# Patient Record
Sex: MALE | Race: BLACK OR AFRICAN AMERICAN | NOT HISPANIC OR LATINO | URBAN - METROPOLITAN AREA
[De-identification: names, ages, dates, MRNs, and addresses within clinical notes are randomized per-mention and may not be internally consistent; named-entity substitution may affect disease eponyms.]

---

## 2017-02-03 ENCOUNTER — OUTPATIENT (OUTPATIENT)
Dept: OUTPATIENT SERVICES | Facility: HOSPITAL | Age: 37
LOS: 1 days | End: 2017-02-03
Payer: COMMERCIAL

## 2017-02-03 VITALS
SYSTOLIC BLOOD PRESSURE: 136 MMHG | OXYGEN SATURATION: 100 % | HEART RATE: 72 BPM | HEIGHT: 66 IN | RESPIRATION RATE: 16 BRPM | DIASTOLIC BLOOD PRESSURE: 86 MMHG | TEMPERATURE: 99 F | WEIGHT: 160.06 LBS

## 2017-02-03 DIAGNOSIS — M23.619: ICD-10-CM

## 2017-02-03 DIAGNOSIS — M23.261 DERANGEMENT OF OTHER LATERAL MENISCUS DUE TO OLD TEAR OR INJURY, RIGHT KNEE: ICD-10-CM

## 2017-02-03 DIAGNOSIS — M23.221 DERANGEMENT OF POSTERIOR HORN OF MEDIAL MENISCUS DUE TO OLD TEAR OR INJURY, RIGHT KNEE: ICD-10-CM

## 2017-02-03 DIAGNOSIS — M23.611 OTHER SPONTANEOUS DISRUPTION OF ANTERIOR CRUCIATE LIGAMENT OF RIGHT KNEE: ICD-10-CM

## 2017-02-03 DIAGNOSIS — Z01.818 ENCOUNTER FOR OTHER PREPROCEDURAL EXAMINATION: ICD-10-CM

## 2017-02-03 LAB
ANION GAP SERPL CALC-SCNC: 6 MMOL/L — SIGNIFICANT CHANGE UP (ref 5–17)
BUN SERPL-MCNC: 12 MG/DL — SIGNIFICANT CHANGE UP (ref 7–18)
CALCIUM SERPL-MCNC: 8.6 MG/DL — SIGNIFICANT CHANGE UP (ref 8.4–10.5)
CHLORIDE SERPL-SCNC: 105 MMOL/L — SIGNIFICANT CHANGE UP (ref 96–108)
CO2 SERPL-SCNC: 31 MMOL/L — SIGNIFICANT CHANGE UP (ref 22–31)
CREAT SERPL-MCNC: 1.14 MG/DL — SIGNIFICANT CHANGE UP (ref 0.5–1.3)
GLUCOSE SERPL-MCNC: 78 MG/DL — SIGNIFICANT CHANGE UP (ref 70–99)
HCT VFR BLD CALC: 48.7 % — SIGNIFICANT CHANGE UP (ref 39–50)
HGB BLD-MCNC: 15.2 G/DL — SIGNIFICANT CHANGE UP (ref 13–17)
MCHC RBC-ENTMCNC: 27.2 PG — SIGNIFICANT CHANGE UP (ref 27–34)
MCHC RBC-ENTMCNC: 31.1 GM/DL — LOW (ref 32–36)
MCV RBC AUTO: 87.5 FL — SIGNIFICANT CHANGE UP (ref 80–100)
PLATELET # BLD AUTO: 160 K/UL — SIGNIFICANT CHANGE UP (ref 150–400)
POTASSIUM SERPL-MCNC: 3.9 MMOL/L — SIGNIFICANT CHANGE UP (ref 3.5–5.3)
POTASSIUM SERPL-SCNC: 3.9 MMOL/L — SIGNIFICANT CHANGE UP (ref 3.5–5.3)
RBC # BLD: 5.57 M/UL — SIGNIFICANT CHANGE UP (ref 4.2–5.8)
RBC # FLD: 12.6 % — SIGNIFICANT CHANGE UP (ref 10.3–14.5)
SODIUM SERPL-SCNC: 142 MMOL/L — SIGNIFICANT CHANGE UP (ref 135–145)
WBC # BLD: 7.1 K/UL — SIGNIFICANT CHANGE UP (ref 3.8–10.5)
WBC # FLD AUTO: 7.1 K/UL — SIGNIFICANT CHANGE UP (ref 3.8–10.5)

## 2017-02-03 PROCEDURE — 80048 BASIC METABOLIC PNL TOTAL CA: CPT

## 2017-02-03 PROCEDURE — 85027 COMPLETE CBC AUTOMATED: CPT

## 2017-02-03 PROCEDURE — G0463: CPT

## 2017-02-03 NOTE — H&P PST ADULT - NSANTHOSAYNRD_GEN_A_CORE
No. SYLVIA screening performed.  STOP BANG Legend: 0-2 = LOW Risk; 3-4 = INTERMEDIATE Risk; 5-8 = HIGH Risk

## 2017-02-03 NOTE — H&P PST ADULT - ACTIVITY
ADLs, Walks >4 flights of stairs, climbs >2 flights of stairs w/o symptoms except for right knee pain

## 2017-02-03 NOTE — H&P PST ADULT - HISTORY OF PRESENT ILLNESS
35 y/o male with no significant past medical history is here today for presurgical evaluation. States he injured his right knee post fall downstairs at his house.  He is scheduled for Right Anterior Cruciate Ligament Reconstruction on 2/9/2017

## 2017-02-03 NOTE — H&P PST ADULT - NEGATIVE CARDIOVASCULAR SYMPTOMS
no peripheral edema/no paroxysmal nocturnal dyspnea/no palpitations/no chest pain/no orthopnea/no dyspnea on exertion/no claudication

## 2017-02-03 NOTE — H&P PST ADULT - NEGATIVE GENERAL SYMPTOMS
no fever/no chills/no weight loss/no polyuria/no polyphagia/no weight gain/no malaise/no fatigue/no sweating/no anorexia/no polydipsia

## 2017-02-03 NOTE — H&P PST ADULT - RS GEN PE MLT RESP DETAILS PC
normal/no chest wall tenderness/no wheezes/breath sounds equal/respirations non-labored/airway patent/good air movement/no subcutaneous emphysema/no rhonchi/clear to auscultation bilaterally/no rales/no intercostal retractions

## 2017-02-03 NOTE — H&P PST ADULT - PROBLEM SELECTOR PLAN 1
Scheduled for Right Anterior Cruciate Ligament Reconstruction on 2/9/2017. Preoperative instructions discussed and given to patient. Verbalized understanding

## 2017-02-03 NOTE — H&P PST ADULT - ASSESSMENT
37 y/o male with derangement of posterior horn of medical meniscus due to old injury, right knee, disruption of anterior cruciate ligament of right knee scheduled for Right Anterior Cruciate Ligament Reconstruction on 2/9/2017. Patient is at low risk for planned procedure 37 y/o male with low SYLVIA risk on STOP BANG screen, derangement of posterior horn of medical meniscus due to old injury, right knee, and disruption of anterior cruciate ligament of right knee scheduled for Right Anterior Cruciate Ligament Reconstruction on 2/9/2017. Patient is at low risk for planned procedure

## 2017-02-09 ENCOUNTER — RESULT REVIEW (OUTPATIENT)
Age: 37
End: 2017-02-09

## 2017-02-09 RX ORDER — SODIUM CHLORIDE 9 MG/ML
3 INJECTION INTRAMUSCULAR; INTRAVENOUS; SUBCUTANEOUS EVERY 8 HOURS
Qty: 0 | Refills: 0 | Status: DISCONTINUED | OUTPATIENT
Start: 2017-02-10 | End: 2017-02-18

## 2017-02-09 RX ORDER — ACETAMINOPHEN 500 MG
975 TABLET ORAL ONCE
Qty: 0 | Refills: 0 | Status: COMPLETED | OUTPATIENT
Start: 2017-02-10 | End: 2017-02-10

## 2017-02-09 RX ORDER — CELECOXIB 200 MG/1
200 CAPSULE ORAL ONCE
Qty: 0 | Refills: 0 | Status: COMPLETED | OUTPATIENT
Start: 2017-02-10 | End: 2017-02-10

## 2017-02-10 ENCOUNTER — OUTPATIENT (OUTPATIENT)
Dept: OUTPATIENT SERVICES | Facility: HOSPITAL | Age: 37
LOS: 1 days | Discharge: HOME | End: 2017-02-10
Payer: COMMERCIAL

## 2017-02-10 VITALS
SYSTOLIC BLOOD PRESSURE: 129 MMHG | OXYGEN SATURATION: 100 % | DIASTOLIC BLOOD PRESSURE: 78 MMHG | TEMPERATURE: 97 F | HEART RATE: 82 BPM | RESPIRATION RATE: 14 BRPM

## 2017-02-10 VITALS
OXYGEN SATURATION: 100 % | TEMPERATURE: 98 F | DIASTOLIC BLOOD PRESSURE: 69 MMHG | RESPIRATION RATE: 17 BRPM | HEIGHT: 66 IN | SYSTOLIC BLOOD PRESSURE: 102 MMHG | WEIGHT: 160.06 LBS | HEART RATE: 83 BPM

## 2017-02-10 DIAGNOSIS — M23.221 DERANGEMENT OF POSTERIOR HORN OF MEDIAL MENISCUS DUE TO OLD TEAR OR INJURY, RIGHT KNEE: ICD-10-CM

## 2017-02-10 DIAGNOSIS — M23.611 OTHER SPONTANEOUS DISRUPTION OF ANTERIOR CRUCIATE LIGAMENT OF RIGHT KNEE: ICD-10-CM

## 2017-02-10 PROCEDURE — 88304 TISSUE EXAM BY PATHOLOGIST: CPT

## 2017-02-10 PROCEDURE — 29888 ARTHRS AID ACL RPR/AGMNTJ: CPT | Mod: RT

## 2017-02-10 PROCEDURE — C1776: CPT

## 2017-02-10 PROCEDURE — 88304 TISSUE EXAM BY PATHOLOGIST: CPT | Mod: 26

## 2017-02-10 PROCEDURE — C1713: CPT

## 2017-02-10 PROCEDURE — 29876 ARTHRS KNEE SURG SYNVCT MAJ: CPT | Mod: RT,XS

## 2017-02-10 PROCEDURE — C1889: CPT

## 2017-02-10 PROCEDURE — 29881 ARTHRS KNE SRG MNISECTMY M/L: CPT | Mod: RT

## 2017-02-10 RX ORDER — ONDANSETRON 8 MG/1
4 TABLET, FILM COATED ORAL EVERY 6 HOURS
Qty: 0 | Refills: 0 | Status: DISCONTINUED | OUTPATIENT
Start: 2017-02-10 | End: 2017-02-18

## 2017-02-10 RX ORDER — CEPHALEXIN 500 MG
1 CAPSULE ORAL
Qty: 20 | Refills: 0 | OUTPATIENT
Start: 2017-02-10 | End: 2017-02-15

## 2017-02-10 RX ORDER — FENTANYL CITRATE 50 UG/ML
25 INJECTION INTRAVENOUS
Qty: 0 | Refills: 0 | Status: DISCONTINUED | OUTPATIENT
Start: 2017-02-10 | End: 2017-02-10

## 2017-02-10 RX ORDER — SODIUM CHLORIDE 9 MG/ML
1000 INJECTION, SOLUTION INTRAVENOUS
Qty: 0 | Refills: 0 | Status: DISCONTINUED | OUTPATIENT
Start: 2017-02-10 | End: 2017-02-18

## 2017-02-10 RX ADMIN — Medication 975 MILLIGRAM(S): at 07:28

## 2017-02-10 RX ADMIN — CELECOXIB 200 MILLIGRAM(S): 200 CAPSULE ORAL at 07:28

## 2017-02-10 RX ADMIN — SODIUM CHLORIDE 3 MILLILITER(S): 9 INJECTION INTRAMUSCULAR; INTRAVENOUS; SUBCUTANEOUS at 07:31

## 2017-02-10 NOTE — PHYSICAL THERAPY INITIAL EVALUATION ADULT - ACTIVE RANGE OF MOTION EXAMINATION, REHAB EVAL
AROM R hip to 50 degrees flex, R knee 0 degrees, R ankle WNL AROM R hip to 50 degrees flex, R knee 0 degrees as in brace, R ankle WNL

## 2017-02-10 NOTE — ASU DISCHARGE PLAN (ADULT/PEDIATRIC). - SPECIAL INSTRUCTIONS
keep right leg in brace in extension at all times. do not bend right knee until instructed by Dr. Murguia. Follow up with Dr. Murguia within 7-10 days for wound check up and further instructions.

## 2017-02-10 NOTE — PHYSICAL THERAPY INITIAL EVALUATION ADULT - GENERAL OBSERVATIONS, REHAB EVAL
Consult received, chart reviewed. Patient received supine in bed, NAD, +knee ext brace, ace wrap at R knee c/d/i, +hemovac, girlfriend and girlfriend's mother present. Patient agreed to EVALUATION from Physical Therapist.

## 2017-02-10 NOTE — ASU DISCHARGE PLAN (ADULT/PEDIATRIC). - NOTIFY
Numbness, tingling/Numbness, color, or temperature change to extremity/Pain not relieved by Medications/Fever greater than 101/Persistent Nausea and Vomiting/Bleeding that does not stop/Swelling that continues

## 2017-02-10 NOTE — ASU DISCHARGE PLAN (ADULT/PEDIATRIC). - ACTIVITY LEVEL
weight bearing as tolerated no weight bearing/weight bearing as tolerated/no sports/gym/no heavy lifting

## 2017-02-10 NOTE — PHYSICAL THERAPY INITIAL EVALUATION ADULT - CRITERIA FOR SKILLED THERAPEUTIC INTERVENTIONS
functional limitations in following categories/impairments found/rehab potential/anticipated discharge recommendation/predicted duration of therapy intervention/therapy frequency

## 2017-02-10 NOTE — PHYSICAL THERAPY INITIAL EVALUATION ADULT - DISCHARGE DISPOSITION, PT EVAL
Outpatient Physical Therapy. Pt. supplied with axillary crutches and demonstrated safe and effective use.

## 2017-02-10 NOTE — ASU DISCHARGE PLAN (ADULT/PEDIATRIC). - MEDICATION SUMMARY - MEDICATIONS TO TAKE
I will START or STAY ON the medications listed below when I get home from the hospital:    Percocet 5/325 oral tablet  -- 1 tab(s) by mouth every 4 hours, As Needed MDD:6 tabs  -- Indication: For pain    Keflex 500 mg oral capsule  -- 1 cap(s) by mouth 4 times a day for 5 days  -- Indication: For prophylactic antibiotic

## 2017-02-10 NOTE — PHYSICAL THERAPY INITIAL EVALUATION ADULT - PASSIVE RANGE OF MOTION EXAMINATION, REHAB EVAL
PROM R hip WFL, R knee 0 degrees ext, R ankle WNL PROM R hip WFL, R knee 0 degrees ext as in brace, R ankle WNL

## 2017-02-10 NOTE — PHYSICAL THERAPY INITIAL EVALUATION ADULT - RANGE OF MOTION EXAMINATION, REHAB EVAL
Left LE ROM was WNL (within normal limits)/Left UE ROM was WNL (within normal limits)/Right UE ROM was WNL (within normal limits)

## 2017-02-12 ENCOUNTER — TRANSCRIPTION ENCOUNTER (OUTPATIENT)
Age: 37
End: 2017-02-12

## 2017-02-14 DIAGNOSIS — M65.861 OTHER SYNOVITIS AND TENOSYNOVITIS, RIGHT LOWER LEG: ICD-10-CM

## 2017-02-14 DIAGNOSIS — M22.41 CHONDROMALACIA PATELLAE, RIGHT KNEE: ICD-10-CM

## 2017-02-14 DIAGNOSIS — F12.10 CANNABIS ABUSE, UNCOMPLICATED: ICD-10-CM

## 2017-02-14 DIAGNOSIS — S83.511A SPRAIN OF ANTERIOR CRUCIATE LIGAMENT OF RIGHT KNEE, INITIAL ENCOUNTER: ICD-10-CM
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
## 2017-02-14 DIAGNOSIS — S83.281A OTHER TEAR OF LATERAL MENISCUS, CURRENT INJURY, RIGHT KNEE, INITIAL ENCOUNTER: ICD-10-CM
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
## 2017-08-12 ENCOUNTER — EMERGENCY (EMERGENCY)
Facility: HOSPITAL | Age: 37
LOS: 1 days | Discharge: HOME | End: 2017-08-12
Attending: EMERGENCY MEDICINE
Payer: MEDICAID

## 2017-08-12 VITALS
TEMPERATURE: 97 F | HEIGHT: 66 IN | HEART RATE: 79 BPM | OXYGEN SATURATION: 100 % | DIASTOLIC BLOOD PRESSURE: 89 MMHG | SYSTOLIC BLOOD PRESSURE: 143 MMHG | WEIGHT: 154.98 LBS | RESPIRATION RATE: 20 BRPM

## 2017-08-12 DIAGNOSIS — N34.2 OTHER URETHRITIS: ICD-10-CM

## 2017-08-12 LAB
APPEARANCE UR: CLEAR — SIGNIFICANT CHANGE UP
BILIRUB UR-MCNC: NEGATIVE — SIGNIFICANT CHANGE UP
COLOR SPEC: YELLOW — SIGNIFICANT CHANGE UP
DIFF PNL FLD: NEGATIVE — SIGNIFICANT CHANGE UP
GLUCOSE UR QL: NEGATIVE — SIGNIFICANT CHANGE UP
KETONES UR-MCNC: ABNORMAL
LEUKOCYTE ESTERASE UR-ACNC: ABNORMAL
NITRITE UR-MCNC: NEGATIVE — SIGNIFICANT CHANGE UP
PH UR: 5 — SIGNIFICANT CHANGE UP (ref 5–8)
PROT UR-MCNC: 15
SP GR SPEC: 1.02 — SIGNIFICANT CHANGE UP (ref 1.01–1.02)
UROBILINOGEN FLD QL: 1

## 2017-08-12 PROCEDURE — 81001 URINALYSIS AUTO W/SCOPE: CPT

## 2017-08-12 PROCEDURE — 99283 EMERGENCY DEPT VISIT LOW MDM: CPT

## 2017-08-12 PROCEDURE — 99283 EMERGENCY DEPT VISIT LOW MDM: CPT | Mod: 25

## 2017-08-12 PROCEDURE — 96372 THER/PROPH/DIAG INJ SC/IM: CPT

## 2017-08-12 RX ORDER — AZITHROMYCIN 500 MG/1
1000 TABLET, FILM COATED ORAL ONCE
Qty: 0 | Refills: 0 | Status: COMPLETED | OUTPATIENT
Start: 2017-08-12 | End: 2017-08-12

## 2017-08-12 RX ORDER — CEFTRIAXONE 500 MG/1
250 INJECTION, POWDER, FOR SOLUTION INTRAMUSCULAR; INTRAVENOUS ONCE
Qty: 0 | Refills: 0 | Status: COMPLETED | OUTPATIENT
Start: 2017-08-12 | End: 2017-08-12

## 2017-08-12 RX ORDER — ONDANSETRON 8 MG/1
4 TABLET, FILM COATED ORAL ONCE
Qty: 0 | Refills: 0 | Status: COMPLETED | OUTPATIENT
Start: 2017-08-12 | End: 2017-08-12

## 2017-08-12 RX ADMIN — AZITHROMYCIN 1000 MILLIGRAM(S): 500 TABLET, FILM COATED ORAL at 11:18

## 2017-08-12 RX ADMIN — CEFTRIAXONE 250 MILLIGRAM(S): 500 INJECTION, POWDER, FOR SOLUTION INTRAMUSCULAR; INTRAVENOUS at 11:18

## 2017-08-12 RX ADMIN — ONDANSETRON 4 MILLIGRAM(S): 8 TABLET, FILM COATED ORAL at 11:18

## 2017-08-12 NOTE — ED ADULT NURSE NOTE - OBJECTIVE STATEMENT
Patient came to the ED a/o x 3 ambulates c/o penile discharges. Patient states burning in urination as well.

## 2017-08-12 NOTE — ED PROVIDER NOTE - OBJECTIVE STATEMENT
35 y/o male with no significant PMHx presents to ED c/o penile discharge x 1 week. Pt notes he is sexually active with his girlfriend whom he has been monogamous with for about one year. He denies any fever, chills, dysuria or any other symptoms at the moment.

## 2017-08-12 NOTE — ED PROVIDER NOTE - MEDICAL DECISION MAKING DETAILS
11:41am- Pt in no distress, will f/u with PMD, refused further STD testing, states has recent testing with negative results. Pt is well appearing walking with normal gait, stable for discharge and follow up with medical doctor. Pt educated on care and need for follow up. Discussed anticipatory guidance and return precautions. Questions answered. I had a detailed discussion with the patient and/or guardian regarding the historical points, exam findings, and any diagnostic results supporting the discharge diagnosis.

## 2017-08-15 LAB
C TRACH RRNA SPEC QL NAA+PROBE: SIGNIFICANT CHANGE UP
N GONORRHOEA RRNA SPEC QL NAA+PROBE: SIGNIFICANT CHANGE UP

## 2017-08-16 LAB — SPECIMEN SOURCE: SIGNIFICANT CHANGE UP

## 2018-04-29 ENCOUNTER — TRANSCRIPTION ENCOUNTER (OUTPATIENT)
Age: 38
End: 2018-04-29

## 2020-03-11 ENCOUNTER — EMERGENCY (EMERGENCY)
Facility: HOSPITAL | Age: 40
LOS: 1 days | Discharge: HOME | End: 2020-03-11
Attending: EMERGENCY MEDICINE
Payer: MEDICAID

## 2020-03-11 VITALS
RESPIRATION RATE: 16 BRPM | OXYGEN SATURATION: 97 % | TEMPERATURE: 100 F | WEIGHT: 154.98 LBS | DIASTOLIC BLOOD PRESSURE: 81 MMHG | SYSTOLIC BLOOD PRESSURE: 144 MMHG | HEIGHT: 65 IN | HEART RATE: 93 BPM

## 2020-03-11 VITALS
HEART RATE: 81 BPM | DIASTOLIC BLOOD PRESSURE: 90 MMHG | SYSTOLIC BLOOD PRESSURE: 139 MMHG | RESPIRATION RATE: 18 BRPM | OXYGEN SATURATION: 99 % | TEMPERATURE: 99 F

## 2020-03-11 LAB
ALBUMIN SERPL ELPH-MCNC: 3.6 G/DL — SIGNIFICANT CHANGE UP (ref 3.5–5)
ALP SERPL-CCNC: 69 U/L — SIGNIFICANT CHANGE UP (ref 40–120)
ALT FLD-CCNC: 21 U/L DA — SIGNIFICANT CHANGE UP (ref 10–60)
ANION GAP SERPL CALC-SCNC: 5 MMOL/L — SIGNIFICANT CHANGE UP (ref 5–17)
AST SERPL-CCNC: 24 U/L — SIGNIFICANT CHANGE UP (ref 10–40)
BASOPHILS # BLD AUTO: 0.03 K/UL — SIGNIFICANT CHANGE UP (ref 0–0.2)
BASOPHILS NFR BLD AUTO: 0.4 % — SIGNIFICANT CHANGE UP (ref 0–2)
BILIRUB SERPL-MCNC: 1.3 MG/DL — HIGH (ref 0.2–1.2)
BUN SERPL-MCNC: 9 MG/DL — SIGNIFICANT CHANGE UP (ref 7–18)
CALCIUM SERPL-MCNC: 8.6 MG/DL — SIGNIFICANT CHANGE UP (ref 8.4–10.5)
CHLORIDE SERPL-SCNC: 108 MMOL/L — SIGNIFICANT CHANGE UP (ref 96–108)
CO2 SERPL-SCNC: 26 MMOL/L — SIGNIFICANT CHANGE UP (ref 22–31)
CREAT SERPL-MCNC: 0.97 MG/DL — SIGNIFICANT CHANGE UP (ref 0.5–1.3)
EOSINOPHIL # BLD AUTO: 0 K/UL — SIGNIFICANT CHANGE UP (ref 0–0.5)
EOSINOPHIL NFR BLD AUTO: 0 % — SIGNIFICANT CHANGE UP (ref 0–6)
GLUCOSE SERPL-MCNC: 92 MG/DL — SIGNIFICANT CHANGE UP (ref 70–99)
HCT VFR BLD CALC: 42.9 % — SIGNIFICANT CHANGE UP (ref 39–50)
HGB BLD-MCNC: 13.7 G/DL — SIGNIFICANT CHANGE UP (ref 13–17)
IMM GRANULOCYTES NFR BLD AUTO: 0.2 % — SIGNIFICANT CHANGE UP (ref 0–1.5)
LIDOCAIN IGE QN: 249 U/L — SIGNIFICANT CHANGE UP (ref 73–393)
LYMPHOCYTES # BLD AUTO: 0.69 K/UL — LOW (ref 1–3.3)
LYMPHOCYTES # BLD AUTO: 8.1 % — LOW (ref 13–44)
MCHC RBC-ENTMCNC: 28 PG — SIGNIFICANT CHANGE UP (ref 27–34)
MCHC RBC-ENTMCNC: 31.9 GM/DL — LOW (ref 32–36)
MCV RBC AUTO: 87.7 FL — SIGNIFICANT CHANGE UP (ref 80–100)
MONOCYTES # BLD AUTO: 0.92 K/UL — HIGH (ref 0–0.9)
MONOCYTES NFR BLD AUTO: 10.8 % — SIGNIFICANT CHANGE UP (ref 2–14)
NEUTROPHILS # BLD AUTO: 6.83 K/UL — SIGNIFICANT CHANGE UP (ref 1.8–7.4)
NEUTROPHILS NFR BLD AUTO: 80.5 % — HIGH (ref 43–77)
NRBC # BLD: 0 /100 WBCS — SIGNIFICANT CHANGE UP (ref 0–0)
PLATELET # BLD AUTO: 196 K/UL — SIGNIFICANT CHANGE UP (ref 150–400)
POTASSIUM SERPL-MCNC: 3.7 MMOL/L — SIGNIFICANT CHANGE UP (ref 3.5–5.3)
POTASSIUM SERPL-SCNC: 3.7 MMOL/L — SIGNIFICANT CHANGE UP (ref 3.5–5.3)
PROT SERPL-MCNC: 7.2 G/DL — SIGNIFICANT CHANGE UP (ref 6–8.3)
RBC # BLD: 4.89 M/UL — SIGNIFICANT CHANGE UP (ref 4.2–5.8)
RBC # FLD: 13.4 % — SIGNIFICANT CHANGE UP (ref 10.3–14.5)
SODIUM SERPL-SCNC: 139 MMOL/L — SIGNIFICANT CHANGE UP (ref 135–145)
WBC # BLD: 8.49 K/UL — SIGNIFICANT CHANGE UP (ref 3.8–10.5)
WBC # FLD AUTO: 8.49 K/UL — SIGNIFICANT CHANGE UP (ref 3.8–10.5)

## 2020-03-11 PROCEDURE — 99283 EMERGENCY DEPT VISIT LOW MDM: CPT

## 2020-03-11 PROCEDURE — 36415 COLL VENOUS BLD VENIPUNCTURE: CPT

## 2020-03-11 PROCEDURE — 80053 COMPREHEN METABOLIC PANEL: CPT

## 2020-03-11 PROCEDURE — 85027 COMPLETE CBC AUTOMATED: CPT

## 2020-03-11 PROCEDURE — 83690 ASSAY OF LIPASE: CPT

## 2020-03-11 RX ORDER — MORPHINE SULFATE 50 MG/1
4 CAPSULE, EXTENDED RELEASE ORAL ONCE
Refills: 0 | Status: DISCONTINUED | OUTPATIENT
Start: 2020-03-11 | End: 2020-03-11

## 2020-03-11 RX ORDER — SODIUM CHLORIDE 9 MG/ML
1000 INJECTION INTRAMUSCULAR; INTRAVENOUS; SUBCUTANEOUS ONCE
Refills: 0 | Status: COMPLETED | OUTPATIENT
Start: 2020-03-11 | End: 2020-03-11

## 2020-03-11 RX ADMIN — SODIUM CHLORIDE 1000 MILLILITER(S): 9 INJECTION INTRAMUSCULAR; INTRAVENOUS; SUBCUTANEOUS at 05:14

## 2020-03-11 NOTE — ED PROVIDER NOTE - PATIENT PORTAL LINK FT
You can access the FollowMyHealth Patient Portal offered by [facility] by registering at the following website: [email or web address]. By joining [employer]’s FollowMyHealth portal, you will also be able to view your health information using other applications (apps) compatible with our system.

## 2020-03-11 NOTE — ED PROVIDER NOTE - CLINICAL SUMMARY MEDICAL DECISION MAKING FREE TEXT BOX
40 y/o male presents with abd pain and diarrhea. Will check labs, UA, CT abd, and reassess. 38 y/o male presents with abd pain and diarrhea. Will check labs, UA, CT abd, and reassess.    labs unremarkable  discussed above with patient, on reeval patient reports he prefers not to have CT done. Patient tolerating po w/o vomiting or worsening pain. Patient stable for discharge.

## 2020-03-11 NOTE — ED PROVIDER NOTE - OBJECTIVE STATEMENT
38 y/o male with no significant PMHx presents to the ED with c/o abd pain x 2 days. Pain began while at work on 3/9/20. Pain worsened yesterday with pt also having 4 episodes of diarrhea; last episode was at 8PM yesterday. Pt denies any fever, vomiting, known sick contacts, recent travels, or other complaints.

## 2020-03-11 NOTE — ED ADULT NURSE NOTE - OBJECTIVE STATEMENT
40 y/o male with no significant PMHx presents to the ED with c/o abd pain x 2 days. Pain began while at work on 3/9/20. Pain worsened yesterday with pt also having 4 episodes of diarrhea; last episode was at 8PM yesterday. Pt denies any fever, vomiting, known sick contacts, recent travels, or other complaints.

## 2020-03-11 NOTE — ED PROVIDER NOTE - NSFOLLOWUPINSTRUCTIONS_ED_ALL_ED_FT
Keep yourself well-hydrated with plenty of fluids.  Followup with PMD for reevaluation.  Return to ED if you develop fever, vomiting or worsening abdominal pain.

## 2020-03-25 NOTE — ED ADULT NURSE NOTE - PAIN RATING/NUMBER SCALE (0-10): ACTIVITY
Pt had to cancel her appt this morning, car wouldn't start. She wanted me to apologize to Dr Saleem Brown for her, she is very sorry she had to cancel.
7

## 2022-04-15 NOTE — ASU PREOP CHECKLIST - ALLERGY BAND ON
no known allergies Partial Purse String (Simple) Text: Given the location of the defect and the characteristics of the surrounding skin a simple purse string closure was deemed most appropriate.  Undermining was performed circumfirentially around the surgical defect.  A purse string suture was then placed and tightened. Wound tension only allowed a partial closure of the circular defect.

## 2022-05-14 ENCOUNTER — EMERGENCY (EMERGENCY)
Facility: HOSPITAL | Age: 42
LOS: 1 days | Discharge: HOME | End: 2022-05-14
Attending: EMERGENCY MEDICINE
Payer: MEDICAID

## 2022-05-14 VITALS
HEART RATE: 57 BPM | WEIGHT: 154.98 LBS | RESPIRATION RATE: 17 BRPM | HEIGHT: 65 IN | SYSTOLIC BLOOD PRESSURE: 117 MMHG | OXYGEN SATURATION: 99 % | DIASTOLIC BLOOD PRESSURE: 78 MMHG | TEMPERATURE: 98 F

## 2022-05-14 PROCEDURE — 99282 EMERGENCY DEPT VISIT SF MDM: CPT

## 2022-05-14 PROCEDURE — 99284 EMERGENCY DEPT VISIT MOD MDM: CPT

## 2022-05-14 NOTE — ED PROVIDER NOTE - PATIENT PORTAL LINK FT
You can access the FollowMyHealth Patient Portal offered by [facility] by registering at the following website: [email or web address]. By joining [employer]’s FollowMyHealth portal, you will also be able to view your health information using other applications (apps) compatible with our system.

## 2022-05-14 NOTE — ED ADULT NURSE NOTE - NSIMPLEMENTINTERV_GEN_ALL_ED
Implemented All Universal Safety Interventions:  [place] to call system. Call bell, personal items and telephone within reach. Instruct patient to call for assistance. Room bathroom lighting operational. Non-slip footwear when patient is off stretcher. Physically safe environment: no spills, clutter or unnecessary equipment. Stretcher in lowest position, wheels locked, appropriate side rails in place.

## 2022-05-14 NOTE — ED ADULT NURSE NOTE - OBJECTIVE STATEMENT
FERMIN ORTIZ COVERING NOTES: AOX4 +ambulatory patient reports lower back pain no trauma no gi gu problems FERMIN DIANA COVERING NOTES: AOX4 +ambulatory patient reports lower back pain s/p getting injured at work no gi gu problems FERMIN DIANA COVERING NOTES: AOX4 +ambulatory patient reports lower back pain s/p getting injured at work no gi gu problems no numbness on the groin area

## 2022-05-14 NOTE — ED PROVIDER NOTE - MUSCULOSKELETAL, MLM
Upper left-sided spine has mild midline tenderness to palpation. Tender from L1 to L3 or L4. Worst bilateral paraspinal tenderness. Pain to straight leg raise, patient unable to tolerate full straight leg raise. Straight leg raise negative bilaterally. 5/5 EHL. Sensory intact to bilateral lower extremities.

## 2022-05-14 NOTE — ED PROVIDER NOTE - OBJECTIVE STATEMENT
41 year old male with no pertinent medical history presents to ED complaining of left-sided back pain which began on Thursday. 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 Since then he states that he has had moderate to severe pain which is worse with movement, but not better with Tylenol or ibuprofen. He endorses that it sometimes causes him trouble to walk around, at which times he requires assistance and/or a cane. Patient denies saddle paresthesia, urinary incontinence, pain shooting down to leg, or fever. He further denies trauma to his back or drug use. NKDA.

## 2022-05-14 NOTE — ED PROVIDER NOTE - CLINICAL SUMMARY MEDICAL DECISION MAKING FREE TEXT BOX
Patient was very upset on discussion of plan of care. Discussed red flag features as well as possibilities in ED for treating his symptoms. Patient doesn't want NSAIDs, Tylenol, or opioids right now. He is asking for an MRI. Advised him that he is unlikely to be able to get and MRI right now in the current circumstance as there are no red flag symptoms, and typically in his circumstance we attempt to refer to spine center and do serial examinations. Patient was very upset and did not want to be further treated. Explained return precautions to patient. Patient was very upset on discussion of plan of care. Discussed red flag features , expectations, as well as possibilities in ED for treating his symptoms. Patient doesn't want NSAIDs, Tylenol, or opioids right now. He is asking for an MRI. Advised him that he is unlikely to be able to get and MRI right now in the current circumstance as there are no red flag symptoms, and typically in his circumstance we attempt to refer to spine center and do serial examinations. Patient was very upset and did not want to be further treated. Explained return precautions to patient.

## 2022-05-14 NOTE — ED PROVIDER NOTE - NSFOLLOWUPINSTRUCTIONS_ED_ALL_ED_FT
IMPORTANT INSTRUCTIONS FROM Dr. HORTON:    Please follow up with your personal medical doctor in 24-48 hours.   Bring results from today to your visit.    Make an appt with the spine center within a few days. Call [phone] for an appt.     If you were advised to take any medications - be sure to review the package insert.    If your symptoms change, get worse or if you have any new symptoms, come to the ER right away.  If you have any questions, call the ER at the phone number on this page.

## 2022-05-20 ENCOUNTER — APPOINTMENT (OUTPATIENT)
Dept: ORTHOPEDIC SURGERY | Facility: CLINIC | Age: 42
End: 2022-05-20

## 2024-06-19 NOTE — ED PROVIDER NOTE - CPE EDP EYES NORM
Hepatitis B Vaccine Assessment for Transplant Candidates    Assessment: Hepatitis B vaccine status has been assessed and discussed with the patient during transplant evaluation. The patient Has had the hepatitis B vaccine series in the past.     .RESUFAST[HSAB     Immunization History   Administered Date(s) Administered    COVID Moderna/Spikevax 12+ ([id number]) 11/10/2023    COVID Pfizer 12Y+ (Requires Dilution) 04/06/2021, 04/27/2021, 12/30/2021    COVID Pfizer Bivalent 12Y+ 04/20/2023    Hep B, Adult, 2 Dose 09/15/2023, 11/10/2023    Influenza, quadrivalent, MDCK, preserve-free 12/20/2022    Influenza, quadrivalent, preserve-free 01/02/2020, 11/10/2023    Influenza, seasonal, injectable, trivalent 03/03/2014    MMR 07/18/2023    Pneumococcal Polysaccharide PPV23 12/30/2018    Pneumococcal conjugate PCV20 05/10/2023    Tdap 05/10/2019    Zoster recombinant 12/12/2023, 02/12/2024       Recommendation:  Hepatitis B vaccine series is not recommended, or may be incomplete at time of transplant   Reason vaccine not recommended (select one): prior immunity (only select with positive Hepatitis B surface antibody result). Hep B Surface Antibody positive on 12/6/2023.                  
normal...

## 2025-03-19 NOTE — ED PROVIDER NOTE - NSTIMEPROVIDERCAREINITIATE_GEN_ER
14-May-2022 11:51 any of your home medications that may cause drowsiness.  *  Please give a list of your current medications to your Primary Care Provider.  *  Please update this list whenever your medications are discontinued, doses are      changed, or new medications (including over-the-counter products) are added.  *  Please carry medication information at all times in case of emergency situations.    These are general instructions for a healthy lifestyle:  No smoking/ No tobacco products/ Avoid exposure to second hand smoke  Surgeon General's Warning:  Quitting smoking now greatly reduces serious risk to your health.  Obesity, smoking, and sedentary lifestyle greatly increases your risk for illness  A healthy diet, regular physical exercise & weight monitoring are important for maintaining a healthy lifestyle    You may be retaining fluid if you have a history of heart failure or if you experience any of the following symptoms:  Weight gain of 3 pounds or more overnight or 5 pounds in a week, increased swelling in our hands or feet or shortness of breath while lying flat in bed.  Please call your doctor as soon as you notice any of these symptoms; do not wait until your next office visit.